# Patient Record
Sex: MALE | Race: WHITE | NOT HISPANIC OR LATINO | URBAN - METROPOLITAN AREA
[De-identification: names, ages, dates, MRNs, and addresses within clinical notes are randomized per-mention and may not be internally consistent; named-entity substitution may affect disease eponyms.]

---

## 2023-05-18 ENCOUNTER — OFFICE VISIT (OUTPATIENT)
Dept: PODIATRY | Facility: CLINIC | Age: 73
End: 2023-05-18
Payer: COMMERCIAL

## 2023-05-18 VITALS — WEIGHT: 239.2 LBS | OXYGEN SATURATION: 97 % | BODY MASS INDEX: 34.24 KG/M2 | HEIGHT: 70 IN

## 2023-05-18 DIAGNOSIS — I87.322 CHRONIC VENOUS HYPERTENSION WITH INFLAMMATION, LEFT: ICD-10-CM

## 2023-05-18 DIAGNOSIS — M79.672 LEFT FOOT PAIN: Primary | ICD-10-CM

## 2023-05-18 DIAGNOSIS — L84 PRE-ULCERATIVE CALLUSES: ICD-10-CM

## 2023-05-18 DIAGNOSIS — L60.3 ONYCHODYSTROPHY: ICD-10-CM

## 2023-05-18 DIAGNOSIS — I73.9 PERIPHERAL ARTERIAL DISEASE: ICD-10-CM

## 2023-05-18 DIAGNOSIS — L03.116 CELLULITIS OF LEFT LOWER EXTREMITY: ICD-10-CM

## 2023-05-18 DIAGNOSIS — M19.072 ARTHRITIS OF BOTH FEET: ICD-10-CM

## 2023-05-18 DIAGNOSIS — M19.071 ARTHRITIS OF BOTH FEET: ICD-10-CM

## 2023-05-18 RX ORDER — AMLODIPINE BESYLATE 5 MG/1
1 TABLET ORAL DAILY
COMMUNITY
Start: 2023-04-24

## 2023-05-18 RX ORDER — DOXYCYCLINE HYCLATE 100 MG
1 TABLET ORAL EVERY 12 HOURS SCHEDULED
COMMUNITY
Start: 2023-05-15

## 2023-05-18 RX ORDER — SUCRALFATE 1 G/1
TABLET ORAL
COMMUNITY
Start: 2023-04-24

## 2023-05-18 RX ORDER — BUDESONIDE AND FORMOTEROL FUMARATE DIHYDRATE 160; 4.5 UG/1; UG/1
2 AEROSOL RESPIRATORY (INHALATION) 2 TIMES DAILY
COMMUNITY
Start: 2023-05-08

## 2023-05-18 RX ORDER — CELECOXIB 200 MG/1
CAPSULE ORAL
COMMUNITY
Start: 2023-05-09

## 2023-05-18 RX ORDER — LOSARTAN POTASSIUM 100 MG/1
1 TABLET ORAL DAILY
COMMUNITY
Start: 2023-05-08

## 2023-05-18 RX ORDER — APIXABAN 2.5 MG/1
1 TABLET, FILM COATED ORAL EVERY 12 HOURS SCHEDULED
COMMUNITY
Start: 2023-04-30

## 2023-05-18 RX ORDER — ASPIRIN 81 MG/1
81 TABLET, CHEWABLE ORAL DAILY
COMMUNITY

## 2023-05-18 NOTE — PROGRESS NOTES
"05/18/2023  Foot and Ankle Surgery - New Patient   Provider: Dr. Cole Sterling DPM  Location: [facility]    Subjective:  Marques Angeles is a 72 y.o. male.     Chief Complaint   Patient presents with   • Left Foot - Callouses, Wound Check, Nail Problem, Edema   • Initial Evaluation     DALIA Anna MD PCP last seen 05/2023       The patient is a 72-year-old male who presents to the clinic for left foot pain. He was referred by Dr. Anna.    He reports his left great toe is his primary concern. Additionally, the patient complains of a callus to the distal tip of his left 5th toe. He states, initially, the area was tender with an ulcer. However, currently it is no longer tender as it is \"encapsulated.\" He notes he fractured his great toe when he was a teenager. The patient reports he purchased compression stockings which did not provide relief.     The patient reports he developed a bacterial infection to the left lower extremity approximately 2 days after his recent visit with Dr. Anna. He adds that he was seen at the emergency room on Sunday, 05/14/2023, where x-rays were obtained and he was prescribed doxycycline. He notes his lower extremity has improved since his emergency room visit; although, the area continues to be erythematous and swollen. The patient reports mild pain localized to the erythematous aspect of his left ankle.     The patient denies any cardiac or lung issues, but states he has a history of arterial disease involving his bilateral lower extremities. He adds that he has an artificial artery in his lower extremity with approximately 50 percent blood flow. He notes surgery was performed by Dr. Hai Qureshi at [facility] in 2020 and 2021.     The patient denies being diabetic.     He reports he smokes 1 pack of cigarettes daily. He notes he did discontinue smoking for approximately 2 years at one point.     Allergies   Allergen Reactions   • Lisinopril-Hydrochlorothiazide " Shortness Of Breath       History reviewed. No pertinent past medical history.    History reviewed. No pertinent surgical history.    History reviewed. No pertinent family history.    Social History     Socioeconomic History   • Marital status:    Tobacco Use   • Smoking status: Every Day     Packs/day: 1.00     Years: 50.00     Pack years: 50.00     Types: Cigarettes   • Smokeless tobacco: Never   Vaping Use   • Vaping Use: Never used   Substance and Sexual Activity   • Alcohol use: Yes     Alcohol/week: 2.0 standard drinks     Types: 2 Cans of beer per week     Comment: every day with dinner   • Drug use: Never   • Sexual activity: Defer        Current Outpatient Medications on File Prior to Visit   Medication Sig Dispense Refill   • amLODIPine (NORVASC) 5 MG tablet Take 1 tablet by mouth Daily.     • celecoxib (CeleBREX) 200 MG capsule TAKE 1 CAPSULE BY MOUTH ONCE DAILY WITH MEALS FOR 30 DAYS     • doxycycline (VIBRAMYICN) 100 MG tablet Take 1 tablet by mouth Every 12 (Twelve) Hours.     • Eliquis 2.5 MG tablet tablet Take 1 tablet by mouth Every 12 (Twelve) Hours.     • losartan (COZAAR) 100 MG tablet Take 1 tablet by mouth Daily.     • sucralfate (CARAFATE) 1 g tablet TAKE 1 TABLET BY MOUTH 4 TIMES DAILY BEFORE MEAL(S)     • Symbicort 160-4.5 MCG/ACT inhaler Inhale 2 puffs 2 (Two) Times a Day.     • aspirin 81 MG chewable tablet Chew 1 tablet Daily.       No current facility-administered medications on file prior to visit.       Review of Systems:  General: Denies fever, chills, fatigue, and weakness.  Eyes: Denies vision loss, blurry vision, and excessive redness.  ENT: Denies hearing issues and difficulty swallowing.  Cardiovascular: Denies palpitations, chest pain, or syncopal episodes.  Respiratory: Denies shortness of breath, wheezing, and coughing.  GI: Denies abdominal pain, nausea, and vomiting.   : Denies frequency, hematuria, and urgency.  Musculoskeletal: Denies muscle cramps, joint pains,  "and stiffness. Positive for swelling and erythema to left lower extremity. Mild pain to the left lower extremity.   Derm: Denies rash, open wounds, or suspicious lesions. Positive for hyperkeratotic lesion noted to the lateral aspect of the left 5th metatarsal head region.  Neuro: Denies headaches, numbness, loss of coordination, and tremors.  Psych: Denies anxiety and depression.  Endocrine: Denies temperature intolerance and changes in appetite.  Heme: Denies bleeding disorders or abnormal bruising.     Objective   Ht 177.8 cm (70\")   Wt 109 kg (239 lb 3.2 oz)   SpO2 97%   BMI 34.32 kg/m²     Foot/Ankle Exam    GENERAL  Orientation:  AAOx3  Affect:  appropriate    VASCULAR     Right Foot Vascularity   Normal vascular exam    Dorsalis pedis:  2+  Posterior tibial:  2+  Skin temperature:  warm  Edema grading:  None  CFT:  < 3 seconds  Pedal hair growth:  Present  Varicosities:  none     Left Foot Vascularity   Normal vascular exam    Dorsalis pedis:  2+  Posterior tibial:  2+  Skin temperature:  warm  Edema grading:  None  CFT:  < 3 seconds  Pedal hair growth:  Present  Varicosities:  none     NEUROLOGIC     Right Foot Neurologic   Light touch sensation: normal  Hot/Cold sensation: normal  Achilles reflex:  2+     Left Foot Neurologic   Light touch sensation: normal  Hot/Cold sensation:  normal  Achilles reflex:  2+    MUSCULOSKELETAL     Right Foot Musculoskeletal   Arch:  Normal     Left Foot Musculoskeletal   Arch:  Normal    MUSCLE STRENGTH     Right Foot Muscle Strength   Normal strength    Foot dorsiflexion:  5  Foot plantar flexion:  5  Foot inversion:  5  Foot eversion:  5     Left Foot Muscle Strength   Normal strength    Foot dorsiflexion:  5  Foot plantar flexion:  5  Foot inversion:  5  Foot eversion:  5    DERMATOLOGIC      Right Foot Dermatologic   Skin  Right foot skin is intact.   Nails  1.  Positive for onychomycosis, abnormal thickness and dystrophic nail.  2.  Positive for onychomycosis, " abnormal thickness and dystrophic nail.  3.  Positive for onychomycosis, abnormal thickness and dystrophic nail.  4.  Positive for onychomycosis, abnormal thickness and dystrophic nail.  5.  Positive for onychomycosis, abnormal thickness and dystrophic nail.     Left Foot Dermatologic   Skin  Left foot skin is intact.   Nails  1.  Positive for onychomycosis, abnormal thickness and dystrophic nail.  2.  Positive for onychomycosis, abnormal thickness and dystrophic nail.  3.  Positive for onychomycosis, abnormal thickness and dystrophic nail.  4.  Positive for onychomycosis, abnormally thick and dystrophic nail.  5.  Positive for onychomycosis, abnormally thick and dystrophic nail. (Pre-ulcerative callus to the lateral aspect. )    TESTS     Right Foot Tests   Anterior drawer: negative  Varus tilt: negative     Left Foot Tests   Anterior drawer: negative  Varus tilt: negative     Right foot additional comments: 05/18/2023  Moderate deformity involving the bilateral feet with bony prominence to the midfoot regions. No significant pain with palpation involving the foot or ankle. DP and PT pulses are weakly palpable to both lower extremities. Sensation appears to be intact.       Left foot additional comments: 05/18/2023  Moderate swelling involving the left lower extremity with erythema at the level of the ankle consistent with cellulitis. No fluctuance or pretty open wound. Hyperkeratotic lesion noted to the lateral aspect of the left 5th metatarsal head region consistent with pre-ulcerative callus. No signs of infection present. Moderate deformity involving the bilateral feet with bony prominence to the midfoot regions. No significant pain with palpation involving the foot or ankle. DP and PT pulses are weakly palpable to both lower extremities. Sensation appears to be intact.      Assessment & Plan   Diagnoses and all orders for this visit:    1. Left foot pain (Primary)  -     XR Foot 3+ View Left    2. Cellulitis of  left lower extremity    3. Chronic venous hypertension with inflammation, left    4. Pre-ulcerative calluses    5. Peripheral arterial disease    6. Arthritis of both feet    7. Onychodystrophy      Patient presents to the office today for evaluation of left great toe pain. X-rays of the left foot were obtained today. Imaging was independently reviewed revealing significant arthritis. Imaging, diagnosis, and treatment options were discussed. Explained onychomycosis is not medically concerning and there are no effective cures. However, the thickness can create issues when ambulating. Discussed a nail avulsion versus routine toenail maintenance. Advised routine toenail maintenance at this time. Recommended Epsom salt soaks additionally if he experiences increased symptoms or symptoms of an ingrown toenail. Regarding his vascular issues and cellulitis, explained smoking is directly related and discussed the importance of smoking cessation as well as the risks involved with continuing. Provided the patient with Tubigrip for compression to his bilateral lower extremities and advised him to wear tennis shoes to assist with support for his arthritis. Advised him to avoid ambulating when barefoot or in flats, sandals, or flip-flops. Regarding the pre-ulcerative callus to his left 5th toe, he was recommended to apply moisturizer such as Aquaphor to his feet daily and complete his course of antibiotics. Advised him to monitor his lower extremity and his feet closely. The patient will return to the office in 4 weeks for re-evaluation. Greater than 30 minutes was spent before, during, and after evaluation for patient care.    Orders Placed This Encounter   Procedures   • XR Foot 3+ View Left     Order Specific Question:   Reason for Exam:     Answer:   left foot lateral callus with ulceration rm 10 wb     Order Specific Question:   Does this patient have a diabetic monitoring/medication delivering device on?     Answer:   No      Order Specific Question:   Release to patient     Answer:   Routine Release        Note is dictated utilizing voice recognition software. Unfortunately this leads to occasional typographical errors. I apologize in advance if the situation occurs. If questions occur please do not hesitate to call our office.    Transcribed from ambient dictation for VALENTINA Sterling DPM by Zahra Tellez.  05/18/23   10:12 EDT    Patient or patient representative verbalized consent to the visit recording.  I have personally performed the services described in this document as transcribed by the above individual, and it is both accurate and complete.

## 2023-06-15 ENCOUNTER — OFFICE VISIT (OUTPATIENT)
Dept: PODIATRY | Facility: CLINIC | Age: 73
End: 2023-06-15
Payer: COMMERCIAL

## 2023-06-15 VITALS — WEIGHT: 239 LBS | RESPIRATION RATE: 20 BRPM | HEIGHT: 70 IN | BODY MASS INDEX: 34.22 KG/M2

## 2023-06-15 DIAGNOSIS — I87.322 CHRONIC VENOUS HYPERTENSION WITH INFLAMMATION, LEFT: ICD-10-CM

## 2023-06-15 DIAGNOSIS — M19.071 ARTHRITIS OF BOTH FEET: ICD-10-CM

## 2023-06-15 DIAGNOSIS — L84 PRE-ULCERATIVE CALLUSES: ICD-10-CM

## 2023-06-15 DIAGNOSIS — L60.3 ONYCHODYSTROPHY: ICD-10-CM

## 2023-06-15 DIAGNOSIS — M19.072 ARTHRITIS OF BOTH FEET: ICD-10-CM

## 2023-06-15 DIAGNOSIS — M79.672 LEFT FOOT PAIN: Primary | ICD-10-CM

## 2023-06-15 DIAGNOSIS — I73.9 PERIPHERAL ARTERIAL DISEASE: ICD-10-CM

## 2023-06-15 RX ORDER — ATORVASTATIN CALCIUM 10 MG/1
TABLET, FILM COATED ORAL
COMMUNITY
Start: 2023-05-20

## 2023-06-15 NOTE — PROGRESS NOTES
06/15/2023  Foot and Ankle Surgery - Established Patient/Follow-up  Provider: Dr. Cole Sterling DPM  Location: [facility]    Subjective:  Marques Angeles is a 72 y.o. male.     Chief Complaint   Patient presents with   • Left Lower Leg - Cellulitis, Follow-up   • Left Foot - Pain, Follow-up, Wound Check   • Follow-up     JIMENA Anna md        HPI:     Marques Angeles is a 72-year-old male who presents to the office today for a follow-up regarding his left foot.    The patient was last seen approximately 1 month ago. He states that his symptoms have remained the same over the last 1 month. He denies any issues with his right foot. The patient has been moisturizing his feet with Gold foot cream approximately every 2 to 3 days. He reports pain involving his left great toe. He inquires if there is a chance that his symptoms will improve without surgery. He notes pain when applying pressure or bumping his left great toe. He wears open-toed shoes because they are stretched out.     The patient reports a history of bilateral artery replacements. He saw Dr. Tim at [facility] in 04/2023 or 05/2023. He states that he had a blood flow test on his leg approximately 2 months ago.    Allergies   Allergen Reactions   • Lisinopril-Hydrochlorothiazide Shortness Of Breath       Current Outpatient Medications on File Prior to Visit   Medication Sig Dispense Refill   • amLODIPine (NORVASC) 5 MG tablet Take 1 tablet by mouth Daily.     • aspirin 81 MG chewable tablet Chew 1 tablet Daily.     • atorvastatin (LIPITOR) 10 MG tablet TAKE 1 TABLET BY MOUTH ONCE DAILY AT BEDTIME FOR 30 DAYS     • celecoxib (CeleBREX) 200 MG capsule TAKE 1 CAPSULE BY MOUTH ONCE DAILY WITH MEALS FOR 30 DAYS     • doxycycline (VIBRAMYICN) 100 MG tablet Take 1 tablet by mouth Every 12 (Twelve) Hours.     • Eliquis 2.5 MG tablet tablet Take 1 tablet by mouth Every 12 (Twelve) Hours.     • losartan (COZAAR) 100 MG tablet Take 1 tablet  "by mouth Daily.     • sucralfate (CARAFATE) 1 g tablet TAKE 1 TABLET BY MOUTH 4 TIMES DAILY BEFORE MEAL(S)     • Symbicort 160-4.5 MCG/ACT inhaler Inhale 2 puffs 2 (Two) Times a Day.       No current facility-administered medications on file prior to visit.       Objective   Resp 20   Ht 177.8 cm (70\")   Wt 108 kg (239 lb)   BMI 34.29 kg/m²     Foot/Ankle Exam    GENERAL  Orientation:  AAOx3  Affect:  appropriate    VASCULAR     Right Foot Vascularity   Normal vascular exam    Dorsalis pedis:  2+  Posterior tibial:  2+  Skin temperature:  warm  Edema grading:  None  CFT:  < 3 seconds  Pedal hair growth:  Present  Varicosities:  none     Left Foot Vascularity   Normal vascular exam    Dorsalis pedis:  2+  Posterior tibial:  2+  Skin temperature:  warm  Edema grading:  None  CFT:  < 3 seconds  Pedal hair growth:  Present  Varicosities:  none     NEUROLOGIC     Right Foot Neurologic   Light touch sensation: normal  Hot/Cold sensation: normal  Achilles reflex:  2+     Left Foot Neurologic   Light touch sensation: normal  Hot/Cold sensation:  normal  Achilles reflex:  2+    MUSCULOSKELETAL     Right Foot Musculoskeletal   Arch:  Normal     Left Foot Musculoskeletal   Arch:  Normal    MUSCLE STRENGTH     Right Foot Muscle Strength   Normal strength    Foot dorsiflexion:  5  Foot plantar flexion:  5  Foot inversion:  5  Foot eversion:  5     Left Foot Muscle Strength   Normal strength    Foot dorsiflexion:  5  Foot plantar flexion:  5  Foot inversion:  5  Foot eversion:  5    DERMATOLOGIC      Right Foot Dermatologic   Skin  Right foot skin is intact.   Nails  1.  Positive for onychomycosis, abnormal thickness and dystrophic nail.  2.  Positive for onychomycosis, abnormal thickness and dystrophic nail.  3.  Positive for onychomycosis, abnormal thickness and dystrophic nail.  4.  Positive for onychomycosis, abnormal thickness and dystrophic nail.  5.  Positive for onychomycosis, abnormal thickness and dystrophic " nail.     Left Foot Dermatologic   Skin  Left foot skin is intact.   Nails  1.  Positive for onychomycosis, abnormal thickness and dystrophic nail.  2.  Positive for onychomycosis, abnormal thickness and dystrophic nail.  3.  Positive for onychomycosis, abnormal thickness and dystrophic nail.  4.  Positive for onychomycosis, abnormally thick and dystrophic nail.  5.  Positive for onychomycosis, abnormally thick and dystrophic nail. (Pre-ulcerative callus to the lateral aspect. )    TESTS     Right Foot Tests   Anterior drawer: negative  Varus tilt: negative     Left Foot Tests   Anterior drawer: negative  Varus tilt: negative     Right foot additional comments: 05/18/2023  Moderate deformity involving the bilateral feet with bony prominence to the midfoot regions. No significant pain with palpation involving the foot or ankle. DP and PT pulses are weakly palpable to both lower extremities. Sensation appears to be intact.       Left foot additional comments: 05/18/2023  Moderate swelling involving the left lower extremity with erythema at the level of the ankle consistent with cellulitis. No fluctuance or pretty open wound. Hyperkeratotic lesion noted to the lateral aspect of the left 5th metatarsal head region consistent with pre-ulcerative callus. No signs of infection present. Moderate deformity involving the bilateral feet with bony prominence to the midfoot regions. No significant pain with palpation involving the foot or ankle. DP and PT pulses are weakly palpable to both lower extremities. Sensation appears to be intact.    06/15/2023  No open wounds or signs of infection. Mild discomfort involving the left great toe with hypertrophy and fungal appearance. No periwound periungual erythema, drainage, or other concerning features.      Assessment & Plan   Diagnoses and all orders for this visit:    1. Left foot pain (Primary)    2. Chronic venous hypertension with inflammation, left    3. Pre-ulcerative  calluses    4. Peripheral arterial disease    5. Arthritis of both feet    6. Onychodystrophy        The patient is a 72-year-old male who presents to the office today for a follow-up regarding his left foot. We discussed the etiology and biomechanics involved with his left foot pain. We discussed surgical intervention to remove the nail and apply acid to prevent the nail from ever growing back. I explained the procedure and recovery in detail. I recommend an open toed shoe to help with the discomfort. I recommend Epsom salt soaks daily to soften the nail. I will order blood flow testing for a baseline. The patient will return to the office in 3 months for a routine foot check.     Greater than 20 minutes was spent before, during, and after evaluation for patient care.    No orders of the defined types were placed in this encounter.         Note is dictated utilizing voice recognition software. Unfortunately this leads to occasional typographical errors. I apologize in advance if the situation occurs. If questions occur please do not hesitate to call our office.    Transcribed from ambient dictation for VALENTINA Sterling DPM by Susi Evans (pasted by: Don Uribe)..  06/15/23   10:28 EDT    Patient or patient representative verbalized consent to the visit recording.  I have personally performed the services described in this document as transcribed by the above individual, and it is both accurate and complete.

## 2023-09-28 ENCOUNTER — OFFICE VISIT (OUTPATIENT)
Dept: PODIATRY | Facility: CLINIC | Age: 73
End: 2023-09-28
Payer: COMMERCIAL

## 2023-09-28 VITALS — HEART RATE: 80 BPM | OXYGEN SATURATION: 96 % | BODY MASS INDEX: 34.22 KG/M2 | WEIGHT: 239 LBS | HEIGHT: 70 IN

## 2023-09-28 DIAGNOSIS — I87.322 CHRONIC VENOUS HYPERTENSION WITH INFLAMMATION, LEFT: ICD-10-CM

## 2023-09-28 DIAGNOSIS — M19.071 ARTHRITIS OF BOTH FEET: ICD-10-CM

## 2023-09-28 DIAGNOSIS — I73.9 PERIPHERAL ARTERIAL DISEASE: ICD-10-CM

## 2023-09-28 DIAGNOSIS — M79.672 LEFT FOOT PAIN: Primary | ICD-10-CM

## 2023-09-28 DIAGNOSIS — M19.072 ARTHRITIS OF BOTH FEET: ICD-10-CM

## 2023-09-28 DIAGNOSIS — L84 PRE-ULCERATIVE CALLUSES: ICD-10-CM

## 2023-09-28 DIAGNOSIS — L60.3 ONYCHODYSTROPHY: ICD-10-CM

## 2023-09-28 NOTE — PROGRESS NOTES
"09/28/2023  Foot and Ankle Surgery - Established Patient/Follow-up  Provider: Dr. Cole Sterling DPM  Location: [facility]    Subjective:  Marques Angeles is a 73 y.o. male.     Chief Complaint   Patient presents with    Left Foot - Follow-up, Nail Problem     Foot check     Right Foot - Follow-up     Foot check     Follow-up     DALIA Anna MD 08/2023       HPI: The patient is a 73-year-old male who returns to the clinic for a routine foot check.    He states his left great toe is not an issue anymore. 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     Allergies   Allergen Reactions    Lisinopril-Hydrochlorothiazide Shortness Of Breath       Current Outpatient Medications on File Prior to Visit   Medication Sig Dispense Refill    amLODIPine (NORVASC) 5 MG tablet Take 1 tablet by mouth Daily.      aspirin 81 MG chewable tablet Chew 1 tablet Daily.      atorvastatin (LIPITOR) 10 MG tablet TAKE 1 TABLET BY MOUTH ONCE DAILY AT BEDTIME FOR 30 DAYS      celecoxib (CeleBREX) 200 MG capsule TAKE 1 CAPSULE BY MOUTH ONCE DAILY WITH MEALS FOR 30 DAYS      doxycycline (VIBRAMYICN) 100 MG tablet Take 1 tablet by mouth Every 12 (Twelve) Hours.      Eliquis 2.5 MG tablet tablet Take 1 tablet by mouth Every 12 (Twelve) Hours.      losartan (COZAAR) 100 MG tablet Take 1 tablet by mouth Daily.      sucralfate (CARAFATE) 1 g tablet TAKE 1 TABLET BY MOUTH 4 TIMES DAILY BEFORE MEAL(S)      Symbicort 160-4.5 MCG/ACT inhaler Inhale 2 puffs 2 (Two) Times a Day.       No current facility-administered medications on file prior to visit.       Objective   Pulse 80   Ht 177.8 cm (70\")   Wt 108 kg (239 lb)   SpO2 96%   BMI 34.29 kg/m²     Foot/Ankle Exam    GENERAL  Orientation:  AAOx3  Affect:  appropriate    VASCULAR     Right Foot Vascularity   Normal vascular exam    Dorsalis pedis:  2+  Posterior tibial:  2+  Skin temperature: "  warm  Edema grading:  None  CFT:  < 3 seconds  Pedal hair growth:  Present  Varicosities:  none     Left Foot Vascularity   Normal vascular exam    Dorsalis pedis:  2+  Posterior tibial:  2+  Skin temperature:  warm  Edema grading:  None  CFT:  < 3 seconds  Pedal hair growth:  Present  Varicosities:  none     NEUROLOGIC     Right Foot Neurologic   Light touch sensation: normal  Hot/Cold sensation: normal  Achilles reflex:  2+     Left Foot Neurologic   Light touch sensation: normal  Hot/Cold sensation:  normal  Achilles reflex:  2+    MUSCULOSKELETAL     Right Foot Musculoskeletal   Arch:  Normal     Left Foot Musculoskeletal   Arch:  Normal    MUSCLE STRENGTH     Right Foot Muscle Strength   Normal strength    Foot dorsiflexion:  5  Foot plantar flexion:  5  Foot inversion:  5  Foot eversion:  5     Left Foot Muscle Strength   Normal strength    Foot dorsiflexion:  5  Foot plantar flexion:  5  Foot inversion:  5  Foot eversion:  5    DERMATOLOGIC      Right Foot Dermatologic   Skin  Right foot skin is intact.   Nails  1.  Positive for onychomycosis, abnormal thickness and dystrophic nail.  2.  Positive for onychomycosis, abnormal thickness and dystrophic nail.  3.  Positive for onychomycosis, abnormal thickness and dystrophic nail.  4.  Positive for onychomycosis, abnormal thickness and dystrophic nail.  5.  Positive for onychomycosis, abnormal thickness and dystrophic nail.     Left Foot Dermatologic   Skin  Left foot skin is intact.   Nails  1.  Positive for onychomycosis, abnormal thickness and dystrophic nail.  2.  Positive for onychomycosis, abnormal thickness and dystrophic nail.  3.  Positive for onychomycosis, abnormal thickness and dystrophic nail.  4.  Positive for onychomycosis, abnormally thick and dystrophic nail.  5.  Positive for onychomycosis, abnormally thick and dystrophic nail. (Pre-ulcerative callus to the lateral aspect. )    TESTS     Right Foot Tests   Anterior drawer: negative  Varus tilt:  negative     Left Foot Tests   Anterior drawer: negative  Varus tilt: negative     Right foot additional comments: 05/18/2023  Moderate deformity involving the bilateral feet with bony prominence to the midfoot regions. No significant pain with palpation involving the foot or ankle. DP and PT pulses are weakly palpable to both lower extremities. Sensation appears to be intact.      09/28/2023  No new issues or concerns. Feet are relatively stable. No pain with palpation.      Left foot additional comments: 05/18/2023  Moderate swelling involving the left lower extremity with erythema at the level of the ankle consistent with cellulitis. No fluctuance or pretty open wound. Hyperkeratotic lesion noted to the lateral aspect of the left 5th metatarsal head region consistent with pre-ulcerative callus. No signs of infection present. Moderate deformity involving the bilateral feet with bony prominence to the midfoot regions. No significant pain with palpation involving the foot or ankle. DP and PT pulses are weakly palpable to both lower extremities. Sensation appears to be intact.    06/15/2023  No open wounds or signs of infection. Mild discomfort involving the left great toe with hypertrophy and fungal appearance. No periwound periungual erythema, drainage, or other concerning features.    09/28/2023  No new issues or concerns. Feet are relatively stable. No pain with palpation. Continued thickness involving the left hallux nail with discoloration and dystrophy. No signs of inflammation or infection.    Assessment & Plan   Diagnoses and all orders for this visit:    1. Left foot pain (Primary)    2. Chronic venous hypertension with inflammation, left    3. Pre-ulcerative calluses    4. Peripheral arterial disease    5. Arthritis of both feet    6. Onychodystrophy        The patient returns to the office today for a routine foot check. No results were obtained or interpreted today. His feet are relatively stable with no  new issues or concerns. However, he does have continued thickness involving the left hallux nail with discoloration and dystrophy, but there are no signs of inflammation or infection. Recommended wearing supportive shoes and performing Epsom salt soaks for symptom management. The patient will return to the office as needed. Greater than 20 minutes was spent before, during, and after evaluation for patient care.    No orders of the defined types were placed in this encounter.         Note is dictated utilizing voice recognition software. Unfortunately this leads to occasional typographical errors. I apologize in advance if the situation occurs. If questions occur please do not hesitate to call our office.    Transcribed from ambient dictation for VALENTINA Sterling DPM by Zahra Tellez.  09/28/23   08:24 EDT    Patient or patient representative verbalized consent to the visit recording.  I have personally performed the services described in this document as transcribed by the above individual, and it is both accurate and complete.     Depressed mood/Anhedonia/Hopelessness or despair